# Patient Record
(demographics unavailable — no encounter records)

---

## 2025-02-18 NOTE — PHYSICAL EXAM
[No Acute Distress] : no acute distress [Obese] : obese [Normal Conjunctiva] : normal conjunctiva [Normal Venous Pressure] : normal venous pressure [No Carotid Bruit] : no carotid bruit [Normal S1, S2] : normal S1, S2 [No Rub] : no rub [No Gallop] : no gallop [Murmur] : murmur [Clear Lung Fields] : clear lung fields [Good Air Entry] : good air entry [No Respiratory Distress] : no respiratory distress  [Soft] : abdomen soft [Non Tender] : non-tender [No Masses/organomegaly] : no masses/organomegaly [Normal Gait] : normal gait [No Edema] : no edema [No Cyanosis] : no cyanosis [No Clubbing] : no clubbing [No Rash] : no rash [No Skin Lesions] : no skin lesions [Moves all extremities] : moves all extremities [No Focal Deficits] : no focal deficits [Normal Speech] : normal speech [Alert and Oriented] : alert and oriented [Normal memory] : normal memory [de-identified] : Grade I-II/VI midsystolic murmur

## 2025-02-18 NOTE — ASSESSMENT
[FreeTextEntry1] : EKG shows normal sinus rhythm at a rate of 89 ; RSR prime V1 and V2; nonspecific T wave abnormality.  Essentially unchanged.   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       Plan:  Recommend continue current blood pressure and cholesterol medication;  Patient encouraged to pursue weight reducing diet and low sodium diet;  Recommend echocardiogram prior to next visit within 5 to 6 months;  Moderate physical activity and exercise regimen; maintain good p.o. hydration especially when exercising;  Return to office within 5-6 months or as needed;

## 2025-02-18 NOTE — HISTORY OF PRESENT ILLNESS
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  The left and right vertebral arteries demonstrate antegrade flow;  Transthoracic Echocardiogram (12/22/2022):  There was normal LV size and wall thickness, with normal systolic and diastolic function; LVEF of 60 to 65%.  The left and right atria normal in size and structure. There was mild IN noted. There was no evidence of pericardial effusion;   Her last regular cardiac stress test in 2021 showed low exercise tolerance, but negative for ST abnormalities or ischemia. Negative for symptoms;

## 2025-02-18 NOTE — REASON FOR VISIT
[FreeTextEntry1] : CALEB EISENBERG is being seen for arrhythmia/ECG abnormalities, structural heart and valve disease, hyperlipidemia and hypertension.   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  She presents back to the office for general cardiac checkup today;  She's been feeling overall very good lately and has no cardiovascular complaints to discuss.  Patient denies chest pain, SOB, PEREZ, palpitations, presyncope, syncope, or edema.

## 2025-03-17 NOTE — DISCUSSION/SUMMARY
[Medication Risks Reviewed] : Medication risks reviewed [Surgical risks reviewed] : Surgical risks reviewed [de-identified] : Patient is a 46-year-old female with severe left knee pain scar for the past 3 months presenting today for initial evaluation.  While she does have mild arthritic changes on her x-ray her pain is out of proportion to her x-ray findings.  For that reason I recommend MRI of her left knee for further evaluation manage.  Discussed low-impact activity exercise.  I recommend meloxicam 15 mg daily as needed for pain.  I will see her back after the MRI for repeat evaluation manage.  All questions were asked and answered

## 2025-03-17 NOTE — HISTORY OF PRESENT ILLNESS
[de-identified] : This is a 46 year old F pt presents today for an initial evaluation with left knee pain. The pain has been there for months without injury. A couple of month ago, the patient stated after getting out the shower, she heard a cracking sound and had immediate swelling/pain.  She reported having two more incidents where she almost fell. Pt is ambulating without use of any assistance device. She presents today with chief complaint of intermittent, moderate dull aching pain over the medial aspect of the knee. Reports constant pain exacerbated by walking, rising from a seated position, standing for long periods of time, and navigating stairs. No radicular pain or paresthesia. No prior history of injection or physical therapy noted. No constitutional symptoms noted. Is taking Ibuprofen for the pain.   left mild 0-120 no no medial

## 2025-03-17 NOTE — ADDENDUM
[FreeTextEntry1] : This note was written by Jeri Laureano, acting as the  for Dr. Vidal on 03/17/2025. This note accurately reflects the work and decisions made by Dr. Vidal.

## 2025-03-17 NOTE — PHYSICAL EXAM
[de-identified] : GENERAL APPEARANCE: Well nourished and hydrated, pleasant, alert, and oriented x 3. Appears their stated age. HEENT: Normocephalic, extraocular eye motion intact. Nasal septum midline. Oral cavity clear. External auditory canal clear. RESPIRATORY: Breath sounds clear and audible in all lobes. No wheezing, No accessory muscle use. CARDIOVASCULAR: No apparent abnormalities. No lower leg edema. No varicosities. Pedal pulses are palpable. NEUROLOGIC: Sensation is normal, no muscle weakness in the upper or lower extremities. DERMATOLOGIC: No apparent skin lesions, moist, warm, no rash. SPINE: Cervical spine appears normal and moves freely; thoracic spine appears normal and moves freely; lumbosacral spine appears normal and moves freely, normal, nontender. MUSCULOSKELETAL: Hands, wrists, and elbows are normal and move freely, shoulders are normal and move freely. PSYCHIATRIC: Oriented to person, place, and time, insight and judgement were intact and the affect was normal. DTRs: Biceps, brachioradialis, triceps, patellar, ankle and plantar 2+ and symmetric bilaterally. Pulses: dorsalis pedis, posterior tibial, femoral, popliteal, and radial 2+ and symmetric bilaterally. Constitutional: Alert and in no acute distress, but well-appearing. [de-identified] : left knee exam shows mild effusion, ROM is 0-120 degrees, no instability, no pain with Yesica, medial joint line tenderness.5/5 motor strength in bilateral lower extremities. Sensory: Intact in bilateral lower extremities. [de-identified] : 4 views of the left knee obtained the office today show no acute fracture or dislocation.  There is mild medial joint space narrowing small osteophyte formation consistent with Kellgren-Viraj grade 1 2 changes

## 2025-04-21 NOTE — HISTORY OF PRESENT ILLNESS
[de-identified] : CALEB is a 46 year old F pt presents today for f/u evaluation with left knee pain. The pain has been there for months without injury.  She was referred for an MRI of the left knee last office visit and does present today to review the results.  She has been taking both oral prednisone as well as intermittent meloxicam.  Overall, she feels that her pain is much improved. She does note an occasional, mild twinge of discomfort primarily to the medial aspect of the knee.  Usually during the start up phase of gait, or when standing from a seated position.  She denies any mechanical catching, locking or buckling.  She denies any radicular pain or paresthesia.

## 2025-04-21 NOTE — PHYSICAL EXAM
[de-identified] : GENERAL APPEARANCE: Well nourished and hydrated, pleasant, alert, and oriented x 3. Appears their stated age. HEENT: Normocephalic, extraocular eye motion intact. Nasal septum midline. Oral cavity clear. External auditory canal clear. RESPIRATORY: Breath sounds clear and audible in all lobes. No wheezing, No accessory muscle use. CARDIOVASCULAR: No apparent abnormalities. No lower leg edema. No varicosities. Pedal pulses are palpable. NEUROLOGIC: Sensation is normal, no muscle weakness in the upper or lower extremities. DERMATOLOGIC: No apparent skin lesions, moist, warm, no rash. SPINE: Cervical spine appears normal and moves freely; thoracic spine appears normal and moves freely; lumbosacral spine appears normal and moves freely, normal, nontender. MUSCULOSKELETAL: Hands, wrists, and elbows are normal and move freely, shoulders are normal and move freely. PSYCHIATRIC: Oriented to person, place, and time, insight and judgement were intact and the affect was normal. DTRs: Biceps, brachioradialis, triceps, patellar, ankle and plantar 2+ and symmetric bilaterally. Pulses: dorsalis pedis, posterior tibial, femoral, popliteal, and radial 2+ and symmetric bilaterally. Constitutional: Alert and in no acute distress, but well-appearing. [de-identified] : left knee exam shows mild effusion, ROM is 0-120 degrees, no instability, no pain with Yesica, Mild medial joint line tenderness.5/5 motor strength in bilateral lower extremities. Sensory: Intact in bilateral lower extremities. [de-identified] :  We did personally review the patient's noncontrast MRI of the left knee performed at an outside facility,  on 3/27/2025. There is evidence of moderate medial compartment osteoarthritis, there is full-thickness articular cartilage loss noted along the medial patella facet, mild mucoid degeneration of the ACL.  No evidence of distinct meniscal injury.  Small joint effusion. Full report in chart.

## 2025-04-21 NOTE — DISCUSSION/SUMMARY
[Medication Risks Reviewed] : Medication risks reviewed [Surgical risks reviewed] : Surgical risks reviewed [de-identified] : CALEB is a 46-year-old female with severe left knee pain scar for the past 3 months presenting today for f/u evaluation.   She does have moderate medial and moderate to advanced patellofemoral compartment osteoarthritis of the left knee.  No significant meniscal or ligamentous injury was noted on recent MRI.  Her pain has been improved, not resolved following a course of prednisone and the intermittent use of meloxicam.  Treatment options were reviewed including viscosupplementation, cortisone injections or total knee arthroplasty. At this point, we have agreed to exhaust all conservative treatment options.  She deferred any injection in the office today. She will continue use of meloxicam as needed for discomfort.  She states that physical therapy previously worsened her pain.  She was instructed on a low impact home exercise program.  We discussed activity modification.  She will follow-up in 6 weeks for reevaluation.  All questions answered.